# Patient Record
(demographics unavailable — no encounter records)

---

## 2024-10-10 NOTE — REVIEW OF SYSTEMS
[Dyspnea on exertion] : dyspnea during exertion [Chest Discomfort] : no chest discomfort [Negative] : Heme/Lymph

## 2024-10-10 NOTE — ASSESSMENT
[FreeTextEntry1] : Dyspnea on exertion -Normal stress echo, equivocal EKG and low exercise.  -Pt still with some BOWEN, which could be related to recent COVID infection. -CCTA negative for CAD.   Hyperlipidemia: Elevated from previous. LDL 120s.->, , HDL 47,  (11/23) ->, , HDL 46,  (02/24) - will continue Crestor  - will monitor lipid profile - encouraged patient to continue healthy exercise and eating habits, focusing on Mediterranean style of eating and aiming for the recommended 150 minutes per week of moderate physical activity.  HTN: BP at goal per ACC/AHA 2018 guidelines -Continue with losartan 100mg PO daily and HCTZ 12.5mg PO daily.   Prediabetes: HA1c 6.1% (11/23)->6.3% (02/24) -Continue with diet and exercise.   Incidental thyroid nodule:  -Went for further imaging.   Follow up in 6 months

## 2024-10-10 NOTE — REASON FOR VISIT
[Other: ____] : [unfilled] [FreeTextEntry1] : Diagnostic Tests: -------------------- EK24-NSR. Normal EKG.  24-NSR. Normal EKG.  23-NSR. Normal EKG.  23-NSR. Low voltage.  22-NSR. Nonspecific ST changes.  -------------------- Echo: 24-TTE: EF 67%. Mild MR, TR.  22-TTE: EF 69%. Trace MR.  -------------------- Stress:  22-Exercise TTE: Exercise 3:53 min. 93% MPHR. METS 6.2. Borderline EKG. Negative for ischemia.  -------------------- US: 11/10/22-Carotid: CHANTELLE <19%, LICA <19%, 2 hypoechoic nodules in left hemithyroid, 0.24 x 0.25cm and 0.5 x 0.28cm. -------------------- CT:  24-CCTA: CAC 0. No CAD.

## 2024-10-10 NOTE — HISTORY OF PRESENT ILLNESS
[FreeTextEntry1] : Ms. Yoder is a 64yo F with PMHx of HTN, HLD, prediabetes, GERD and BOWEN who presents for follow up. Her PMD is Dr. Kayli Bardales. She has a strong FH of CAD, mother had PCIs and father had CABG. Patient's older brother passed from cardiac tamponade at the age of 64. She feels SOB when walks up 2 flights of stairs. Denies chest pain, palpitations, dizziness.   -She feels her SOB has been better than prior. No new symptoms of CP, palpitations. Discussed incidental thyroid nodules, pt may have had in the past.  08/03/23-She had recent bunion surgery. She has been having some difficulty walking. Otherwise doing well. She just had her first grandchild.  02/01/24-Patient had COVID around the holidays. She has been feeling worsening SOB. One episode of CP.  04/04/24-Patient feeling well. She will be going on a trip with her daughter to [place] and [place].

## 2025-03-17 NOTE — DISCUSSION/SUMMARY
[FreeTextEntry1] : Pap done Self breast exam stressed Requested copies of recent and future mammogram reports Follow-up yearly or as needed

## 2025-03-17 NOTE — HISTORY OF PRESENT ILLNESS
[FreeTextEntry1] : Patient is 63 years old para 2-0-0-2 last menstrual period 2014 She denies postmenopausal bleeding and is present without complaints. Patient states that her last Pap was 6 to 7 years ago She has a history of right breast biopsy secondary to calcifications and is followed at [facility] for breast screenings

## 2025-07-02 NOTE — REASON FOR VISIT
[FreeTextEntry1] : Diagnostic Tests: -------------------- EK24-NSR. Normal EKG.  24-NSR. Normal EKG.  23-NSR. Normal EKG.  23-NSR. Low voltage.  22-NSR. Nonspecific ST changes.  -------------------- Echo: 24-TTE: EF 67%. Mild MR, TR.  22-TTE: EF 69%. Trace MR.  -------------------- Stress:  22-Exercise TTE: Exercise 3:53 min. 93% MPHR. METS 6.2. Borderline EKG. Negative for ischemia.  -------------------- US: 11/10/22-Carotid: CHANTELLE <19%, LICA <19%, 2 hypoechoic nodules in left hemithyroid, 0.24 x 0.25cm and 0.5 x 0.28cm. -------------------- CT:  24-CCTA: CAC 0. No CAD.

## 2025-07-02 NOTE — ASSESSMENT
[FreeTextEntry1] : Dyspnea on exertion -Normal stress echo, equivocal EKG and low exercise.  -Pt still with some BOWEN, which could be related to recent COVID infection. -CCTA negative for CAD.   Hyperlipidemia: Elevated from previous. LDL 120s.->, , HDL 47,  (11/23) ->, , HDL 46,  (02/24)->, , HDL 47, LDL 94 (03/25) - will continue Crestor  - will monitor lipid profile - encouraged patient to continue healthy exercise and eating habits, focusing on Mediterranean style of eating and aiming for the recommended 150 minutes per week of moderate physical activity.  HTN: BP at goal per ACC/AHA 2018 guidelines -Continue with losartan 100mg PO daily and HCTZ 12.5mg PO daily.   Prediabetes: HA1c 6.1% (11/23)->6.3% (02/24)->5.9% (03/25) -Continue with diet and exercise.   Incidental thyroid nodule:  -Went for further imaging.   Follow up in 6 months

## 2025-07-02 NOTE — HISTORY OF PRESENT ILLNESS
[FreeTextEntry1] : Ms. Yoder is a 65yo F with PMHx of HTN, HLD, prediabetes, GERD and BOWEN who presents for follow up. Her PMD is Dr. Kayli Bardales. She has a strong FH of CAD, mother had PCIs and father had CABG. Patient's older brother passed from cardiac tamponade at the age of 64. She feels SOB when walks up 2 flights of stairs. Denies chest pain, palpitations, dizziness.   -She feels her SOB has been better than prior. No new symptoms of CP, palpitations. Discussed incidental thyroid nodules, pt may have had in the past.  08/03/23-She had recent bunion surgery. She has been having some difficulty walking. Otherwise doing well. She just had her first grandchild.  02/01/24-Patient had COVID around the holidays. She has been feeling worsening SOB. One episode of CP.  04/04/24-Patient feeling well. She will be going on a trip with her daughter to [place] and [place].